# Patient Record
Sex: FEMALE | Race: BLACK OR AFRICAN AMERICAN | NOT HISPANIC OR LATINO | URBAN - METROPOLITAN AREA
[De-identification: names, ages, dates, MRNs, and addresses within clinical notes are randomized per-mention and may not be internally consistent; named-entity substitution may affect disease eponyms.]

---

## 2017-07-24 ENCOUNTER — OUTPATIENT (OUTPATIENT)
Dept: OUTPATIENT SERVICES | Facility: HOSPITAL | Age: 42
LOS: 1 days | End: 2017-07-24

## 2017-07-24 VITALS
RESPIRATION RATE: 14 BRPM | HEART RATE: 64 BPM | WEIGHT: 166.01 LBS | SYSTOLIC BLOOD PRESSURE: 118 MMHG | DIASTOLIC BLOOD PRESSURE: 78 MMHG | HEIGHT: 69.25 IN | TEMPERATURE: 98 F

## 2017-07-24 DIAGNOSIS — M20.11 HALLUX VALGUS (ACQUIRED), RIGHT FOOT: ICD-10-CM

## 2017-07-24 DIAGNOSIS — F17.200 NICOTINE DEPENDENCE, UNSPECIFIED, UNCOMPLICATED: ICD-10-CM

## 2017-07-24 DIAGNOSIS — M20.10 HALLUX VALGUS (ACQUIRED), UNSPECIFIED FOOT: ICD-10-CM

## 2017-07-24 LAB
HCT VFR BLD CALC: 34.1 % — LOW (ref 34.5–45)
HGB BLD-MCNC: 11.6 G/DL — SIGNIFICANT CHANGE UP (ref 11.5–15.5)
MCHC RBC-ENTMCNC: 28 PG — SIGNIFICANT CHANGE UP (ref 27–34)
MCHC RBC-ENTMCNC: 34 % — SIGNIFICANT CHANGE UP (ref 32–36)
MCV RBC AUTO: 82.4 FL — SIGNIFICANT CHANGE UP (ref 80–100)
NRBC # FLD: 0 — SIGNIFICANT CHANGE UP
PLATELET # BLD AUTO: 285 K/UL — SIGNIFICANT CHANGE UP (ref 150–400)
PMV BLD: 11.1 FL — SIGNIFICANT CHANGE UP (ref 7–13)
RBC # BLD: 4.14 M/UL — SIGNIFICANT CHANGE UP (ref 3.8–5.2)
RBC # FLD: 12.8 % — SIGNIFICANT CHANGE UP (ref 10.3–14.5)
WBC # BLD: 7.44 K/UL — SIGNIFICANT CHANGE UP (ref 3.8–10.5)
WBC # FLD AUTO: 7.44 K/UL — SIGNIFICANT CHANGE UP (ref 3.8–10.5)

## 2017-07-24 NOTE — H&P PST ADULT - PROBLEM SELECTOR PLAN 1
Right Foot Dave and Akin/Hallux Valgus Removal scheduled for 8/04/2017.  Pre-op instructions given. Pt verbalized understanding.  Pepcid given for GI prophylaxis.  Chlorhexidine wash instructions given.  UCG ordered STAT for day of procedure - urine container given.

## 2017-07-24 NOTE — H&P PST ADULT - MUSCULOSKELETAL
detailed exam no joint swelling/no joint erythema/ROM intact/no joint warmth/no calf tenderness/normal strength details…

## 2017-07-24 NOTE — H&P PST ADULT - LYMPHATIC
anterior cervical R/anterior cervical L/posterior cervical R/supraclavicular L/posterior cervical L/supraclavicular R

## 2017-07-24 NOTE — H&P PST ADULT - NEGATIVE MUSCULOSKELETAL SYMPTOMS
no leg pain L/no arthritis/no neck pain/no arm pain L/no arm pain R/no myalgia/no muscle cramps/no stiffness/no back pain/no joint swelling/no muscle weakness

## 2017-07-24 NOTE — H&P PST ADULT - HISTORY OF PRESENT ILLNESS
43yo female denies significant medical history reports pain to right foot, great toe region hallux for over 2 years with progressive pain and problems with wearing certain shoes. Pt presents today for presurgical evaluation for Right Foot Dave and Akin/Hallux Valgus Removal scheduled for 8/04/2017. 41yo female current light tobacco smoker, denies significant medical history reports pain to right foot, great toe region hallux for over 2 years with progressive pain and problems with wearing certain shoes. Pt presents today for presurgical evaluation for Right Foot Dave and Akin/Hallux Valgus Removal scheduled for 8/04/2017.

## 2017-07-24 NOTE — H&P PST ADULT - NSANTHOSAYNRD_GEN_A_CORE
No. JERI screening performed.  STOP BANG Legend: 0-2 = LOW Risk; 3-4 = INTERMEDIATE Risk; 5-8 = HIGH Risk

## 2017-08-04 ENCOUNTER — OUTPATIENT (OUTPATIENT)
Dept: OUTPATIENT SERVICES | Facility: HOSPITAL | Age: 42
LOS: 1 days | Discharge: HOME | End: 2017-08-04
Payer: COMMERCIAL

## 2017-08-04 VITALS
OXYGEN SATURATION: 100 % | SYSTOLIC BLOOD PRESSURE: 126 MMHG | DIASTOLIC BLOOD PRESSURE: 80 MMHG | RESPIRATION RATE: 15 BRPM | HEART RATE: 56 BPM

## 2017-08-04 VITALS
HEIGHT: 69.25 IN | OXYGEN SATURATION: 100 % | HEART RATE: 57 BPM | TEMPERATURE: 99 F | WEIGHT: 166.01 LBS | SYSTOLIC BLOOD PRESSURE: 133 MMHG | DIASTOLIC BLOOD PRESSURE: 80 MMHG | RESPIRATION RATE: 16 BRPM

## 2017-08-04 DIAGNOSIS — M20.11 HALLUX VALGUS (ACQUIRED), RIGHT FOOT: ICD-10-CM

## 2017-08-04 PROCEDURE — 73630 X-RAY EXAM OF FOOT: CPT | Mod: 26,RT

## 2017-08-04 NOTE — ASU DISCHARGE PLAN (ADULT/PEDIATRIC). - INSTRUCTIONS
No fried, spicy or greasy foods. Increase fluids as tolerated  If your doctor prescribed narcotic pain medications after your procedure to help prevent and reduce constipation, increase fluid intake and fiber intake in your diet. You may take OTC Stool Softeners such as Colace to help reduce constipation.

## 2017-08-04 NOTE — PACU DISCHARGE NOTE - THE ANESTHESIA ORDERS USED IN THE PACU ORDER SET WILL BE DISCONTINUED UPON TRANSFER OF THIS PATIENT
Kim Schoen MSW / Psychiatric Specialist/scribe for Dr. Connolly / pager [phone]    Writer met with pt to follow up on Dr Connolly's recommendation for PHP addiction treatment. Pt accepted [facility] name and phone number to call to enroll in intensive treatment at the [facility], and a pamphlet about [facility] IOP and PHP programs. Pt said he will call and work with [facility] to enroll. Pt also provided [facility] local group information and list of local counseling agencies. Writer wrote her pager number on pt's white board for further contact, if pt needs.     Addendum: Writer called back to pt's room so writer could discuss treatment with pt's parents. Pt was agreeable to discussion. [facility] phone number enntered into pt's AVS. RN updated.    Statement Selected

## 2017-08-04 NOTE — ASU DISCHARGE PLAN (ADULT/PEDIATRIC). - ACTIVITY LEVEL
elevate extremity/weight bearing as tolerated/no tub baths/no exercise/no heavy lifting/no sports/gym

## 2017-08-04 NOTE — BRIEF OPERATIVE NOTE - PROCEDURE
Akin bunionectomy of right great toe  08/04/2017    Active  AALI7  Dave bunionectomy of right great toe  08/04/2017    Active  AALI7

## 2017-08-04 NOTE — ASU DISCHARGE PLAN (ADULT/PEDIATRIC). - NOTIFY
Pain not relieved by Medications/Persistent Nausea and Vomiting/Swelling that continues/Bleeding that does not stop/Increased Irritability or Sluggishness/Inability to Tolerate Liquids or Foods/Fever greater than 101

## 2019-10-11 NOTE — ASU PREOPERATIVE ASSESSMENT, ADULT (IPARK ONLY) - LOCKER #
89 year old female with history of HTN, diverticulosis, and B12 deficiency presents with anemia. The   patient has been having weakness, loss of appetite, headache worsening for 5 days PTA.    Last month she was admitted to [facility] for similar presentation, found to be anemic, received transfusion and discharged on B12 injections.   Pt was doing relatively well until 5 days when she stared to have similar symptoms and noted to  be jaundiced. The pt denies chest pain, nausea, vomiting but  reports on/off LLQ for the last month.   Blood work from last admission showed hemolytic pattern with low haptoglobin, elevated LDH.   In ED patient was hemodynamically stable, received one unit and had unremarkable CT abdomen.  The pt is being admitted for hemolytic anemia, PRBC transfusions,  IV steroids and RTX infusion    PAST MEDICAL & SURGICAL HISTORY:     HTN, ASHD  TIA (transient ischemic attack)  Chronic anemia  CKD  Diverticulosis, Diverticulitis  Chronic Constipation  OA, DDD, DJD, kyphosis, Osteoporosis  No significant past surgical history    Meds;    enoxaparin 40mg q 24  pantoprazole 40mg q 24  prednisone 80mg q 24, dec to 50mg q 24  vit B12 1000mc po q 24  folic acid 1mg q 24  nifedipine XL 30gmg q24  triamterene-HCTZ 37.5/25mg q 24    Overnight events:  pt clinically improved, jaundice and sallow complexion much improved, much more energetic, for RTX infusion today    Vital Signs Last 24 Hrs    T(F): 97.6  HR: 72  BP: 121/58    RR: 18  SpO2: 97%    Physical Exam:    GENERAL: thin, frail, elderly WF, chronically ill looking but in NAD, more energetic  HEAD:  Atraumatic, Normocephalic, Sclerae much less icteric  ENT: Moist mucous membranes  CHEST/LUNG: Clear to auscultation bilaterally;   HEART: Regular rate and rhythm; No murmurs, rubs, or gallops  ABDOMEN: Soft, Nontender, Nondistended.   EXTREMITIES:  brisk capillary refill. No clubbing, cyanosis, or edema  NERVOUS SYSTEM:  Alert & Oriented X3, speech clear. No deficits                  7.6    6 )-----------( 132     , retic %  21.6, absolute retic 540              24.9     134  |  104  |  49  ----------------------------<  107  4.3  |  19  |  1.6    GFR 30, 33, 32, 28  Ca    9.6          TPro  5.8,   /  Alb  4.1, 3.9  /  TBili  6.9, 4.1, 2.4,  1.5 /  DBili  1.1, 0.4  /I Bili d1.1  AST  51<H>  /  ALT  11  /  AlkPhos  72  10-05     LDH   938,  884,  736,  617,  557                      Bilirubin Direct, Serum (10.05.19 @ 01:00)    Bilirubin Direct, Serum: 1.1 mg/dL    Gamma Glutamyl Transferase, Serum (10.05.19 @ 01:00)    Gamma Glutamyl Transferase, Serum: 9 U/L    Lactate Dehydrogenase, Serum (10.05.19 @ 01:00)    Lactate Dehydrogenase, Serum: 784    Direct Lorna Profile (10.04.19 @ 20:10)    Dir Antiglob Polyspecific Interpretation: POS: 10/04/2019 23:17  AVILLAFRAN  T    Bilirubin Total, Serum: 7.8 mg/dL (10.04.19 @ 18:20)      EXAM:  US ABDOMEN LIMITED            INTERPRETATION:  CLINICAL HISTORY: Jaundice.    COMPARISON: None.    PROCEDURE: Ultrasound of the right upper quadrant was performed.    FINDINGS:    LIVER:  Normal in contour and echogenicity measuring 14.3 cm in length,   containing multiple cysts the largest measuring up to approximately 2.2 x   2.4 x 2.3 cm.    GALLBLADDER/BILIARY TREE:  No evidence of cholelithiasis. No wall   thickening or pericholecystic fluid.  Negative sonographic Cameron's sign.   No intrahepatic biliary ductal dilatation. The common bile duct measures   7 mm, which is normal or age.     PANCREAS: Obscured by overlying bowel gas.    KIDNEY:  Right kidney measures 9.2 cm in length, with a mid pole cyst   measuring up to 0.8 cm. No hydronephrosis, calculi or solid mass.    AORTA/IVC:  Visualized proximal portions unremarkable.    ASCITES:  None.    IMPRESSION:    Essentially unremarkable right upper quadrant ultrasound.  Nonvisualization of the pancreas.          EXAM:  CT ABDOMEN AND PELVIS IC            PROCEDURE DATE:  10/04/2019            INTERPRETATION:  CLINICAL STATEMENT: Left lower quadrant abdominal pain.      TECHNIQUE: Contiguous axial CT images were obtained from the lower chest   to the pubic symphysis following administration of 100cc Optiray 320   intravenous contrast.  Oral contrast was not administered.  Reformatted   images in the coronal and sagittal planes were acquired.    COMPARISON CT: CT of the abdomen and pelvis dated 4/2/2018.      FINDINGS:    LOWER CHEST: Partially imaged right sided pleural effusion. Bilateral   subsegmental and dependent atelectasis also seen.    HEPATOBILIARY: Right hepatic cyst and additional subcentimeter hepatic   hypodensities, too small to further characterize.    SPLEEN: Unremarkable.    PANCREAS: Unchanged pancreatic tail hypodense lesion, likely a side   branch IPMN.    ADRENAL GLANDS: Unremarkable.    KIDNEYS: Symmetric renal enhancement. No hydronephrosis. Bilateral renal   cysts and additional subcentimeter bilateral hypodensities, too small to   further characterize.    ABDOMINOPELVIC NODES: No lymphadenopathy.    PELVIC ORGANS: Unremarkable.    PERITONEUM/MESENTERY/BOWEL: Sigmoid diverticulosis without definitive   evidence of diverticulitis. No bowel obstruction or pneumoperitoneum.   Large hiatal hernia.    BONES/SOFT TISSUES: No acute osseous abnormality. Degenerative changes of   the spine. Small fat-containing umbilical hernia and bilateral inguinal   hernias.    OTHER: Aortic atherosclerosis.      IMPRESSION:     No CT evidence of acute intra-abdominal pathology.    Partially imaged right-sided pleural effusion.    Additional Findings/Recommendations After Attending Radiologist Review:    Mild splenomegaly, new.              ARUN LOTT M.D., RESIDENT RADIOLOGIST  This document has been electronically signed.  IRON DUARTE M.D., ATTENDING RADIOLOGIST  This document has been electronically signed. Oct  4 2019 11:47PM 22

## 2021-03-02 NOTE — H&P PST ADULT - PULMONARY EMBOLUS
Occupational Therapy Screening:  Services maybe indicated at this time. [facility] based on results obtained during the nursing admission assessment. The patients chart was reviewed . Please order a consult for occupational therapy if patient has had a decline in function from baseline and you would like an evaluation to be completed. Thank you.
no
